# Patient Record
Sex: MALE | Race: WHITE | NOT HISPANIC OR LATINO | Employment: FULL TIME | URBAN - METROPOLITAN AREA
[De-identification: names, ages, dates, MRNs, and addresses within clinical notes are randomized per-mention and may not be internally consistent; named-entity substitution may affect disease eponyms.]

---

## 2018-09-27 ENCOUNTER — OFFICE VISIT (OUTPATIENT)
Dept: INTERNAL MEDICINE CLINIC | Facility: CLINIC | Age: 33
End: 2018-09-27
Payer: COMMERCIAL

## 2018-09-27 VITALS
SYSTOLIC BLOOD PRESSURE: 118 MMHG | WEIGHT: 212.6 LBS | OXYGEN SATURATION: 97 % | BODY MASS INDEX: 26.43 KG/M2 | HEIGHT: 75 IN | DIASTOLIC BLOOD PRESSURE: 86 MMHG | HEART RATE: 76 BPM

## 2018-09-27 DIAGNOSIS — F17.210 CIGARETTE NICOTINE DEPENDENCE WITHOUT COMPLICATION: Primary | ICD-10-CM

## 2018-09-27 DIAGNOSIS — Z13.6 SCREENING FOR CARDIOVASCULAR CONDITION: ICD-10-CM

## 2018-09-27 PROCEDURE — 99203 OFFICE O/P NEW LOW 30 MIN: CPT | Performed by: INTERNAL MEDICINE

## 2018-09-27 PROCEDURE — 3008F BODY MASS INDEX DOCD: CPT | Performed by: INTERNAL MEDICINE

## 2018-09-27 PROCEDURE — 4004F PT TOBACCO SCREEN RCVD TLK: CPT | Performed by: INTERNAL MEDICINE

## 2018-09-27 NOTE — PROGRESS NOTES
INTERNAL MEDICINE OFFICE VISIT  [facility] AT [address], [address] [facility] [place]  Tel: [phone]      NAME: Dipti Hess  AGE: 27 y o  SEX: male  : 1985   MRN: [id number]    DATE: 2018  TIME: 1:45 PM      Assessment and Plan:  1  Cigarette nicotine dependence without complication  Patient was counseled to quit smoking  - CBC and differential; Future  - Comprehensive metabolic panel; Future    2  Screening for cardiovascular condition    - Lipid panel; Future      - Counseling Documentation: patient was counseled regarding: instructions for management, risk factor reductions, prognosis, patient and family education, impressions, risks and benefits of treatment options and importance of compliance with treatment  - Medication Side Effects: Adverse side effects of medications were reviewed with the patient/guardian today  Return for follow up visit in 1 year or earlier, if needed  Chief Complaint:  Chief Complaint   Patient presents with    [facility]         History of Present Illness: This is a new patient who has moved from [place]  Current smoker-patient has been smoking for the last 15 years  He said he quit smoking for about 6 months last year but started smoking again  He does not want to take any medication to help him quit at this point  He needs blood work done  Active Problem List:  Patient Active Problem List   Diagnosis    Cigarette nicotine dependence without complication         Past Medical History:  History reviewed  No pertinent past medical history        Past Surgical History:  Past Surgical History:   Procedure Laterality Date    WISDOM TOOTH EXTRACTION           Family History:  Family History   Problem Relation Age of Onset    Heart defect Father     Heart disease Maternal Grandfather     Diabetes Maternal Grandfather     Diabetes Paternal Grandfather     Heart disease Paternal Grandfather  Diabetes Paternal Aunt          Social History:  Social History     Social History    Marital status: /Civil Union     Spouse name: N/A    Number of children: N/A    Years of education: N/A     Social History Main Topics    Smoking status: Current Every Day Smoker     Packs/day: 1 00     Years: 15 00    Smokeless tobacco: Never Used    Alcohol use Yes    Drug use: No    Sexual activity: Yes     Partners: Male     Other Topics Concern    None     Social History Narrative    None         Allergies:  No Known Allergies      Medications:  No current outpatient prescriptions on file        The following portions of the patient's history were reviewed and updated as appropriate: past medical history, past surgical history, family history, social history, allergies, current medications and active problem list       Review of Systems:  Constitutional: Denies fever, chills, weight gain, weight loss, fatigue  Eyes: Denies eye redness, eye discharge, double vision, change in visual acuity  ENT: Denies hearing loss, tinnitus, sneezing, nasal congestion, nasal discharge, sore throat   Respiratory: Denies cough, expectoration, hemoptysis, shortness of breath, wheezing  Cardiovascular: Denies chest pain, palpitations, lower extremity swelling, orthopnea, PND  Gastrointestinal: Denies abdominal pain, heartburn, nausea, vomiting, hematemesis, diarrhea, bloody stools  Genito-Urinary: Denies dysuria, frequency, difficulty in micturition, nocturia, incontinence  Musculoskeletal: Denies back pain, joint pain, muscle pain  Neurologic: Denies confusion, lightheadedness, syncope, headache, focal weakness, sensory changes, seizures  Endocrine: Denies polyuria, polydipsia, temperature intolerance  Allergy and Immunology: Denies hives, insect bite sensitivity  Hematological and Lymphatic: Denies bleeding problems, swollen glands   Psychological: Denies depression, suicidal ideation, anxiety, panic, mood swings  Dermatological: Denies pruritus, rash, skin lesion changes      Vitals:  Vitals:    09/27/18 1329   BP: 118/86   Pulse: 76   SpO2: 97%       Body mass index is 26 57 kg/m²  Weight (last 2 days)     Date/Time   Weight    09/27/18 1329  96 4 (212 6)                Physical Examination:  General: Patient is not in acute distress  Awake, alert, responding to commands  No weight gain or loss  Head: Normocephalic  Atraumatic  Eyes: Conjunctiva and lids with no swelling, erythema or discharge  Both pupils normal sized, round and reactive to light  Sclera nonicteric  ENT: External examination of nose and ear normal  Otoscopic examination shows translucent tympanic membranes with patent canals without erythema  Oropharynx moist with no erythema, edema, exudate or lesions  Neck: Supple  JVP not raised  Trachea midline  No masses  No thyromegaly  Lungs: No signs of increased work of breathing or respiratory distress  Bilateral bronchovascular breath sounds with no crackles or rhonchi  Chest wall: No tenderness  Cardiovascular: Normal PMI  No thrills  Regular rate and rhythm  S1 and S2 normal  No murmur, rub or gallop  Gastrointestinal: Abdomen soft, nontender  No guarding or rigidity  Liver and spleen not palpable  Bowel sounds present  Neurologic: Cranial nerves II-XII intact   Cortical functions normal  Motor system - Reflexes 2+ and symmetrical  Sensations normal  Musculoskeletal: Gait normal  No joint tenderness  Integumentary: Skin normal with no rash or lesions  Lymphatic: No palpable lymph nodes in neck, axilla or groin  Extremities: No clubbing, cyanosis, edema or varicosities  Psychological: Judgement and insight normal  Mood and affect normal      Laboratory Results:  CBC with diff:   No results found for: WBC, RBC, HGB, HCT, MCV, MCH, RDW, PLT    CMP:  No results found for: CREATININE, BUN, NA, K, CL, CO2, GLUCOSE, PROT, ALKPHOS, ALT, AST, BILIDIR    No results found for: HGBA1C, MG, PHOS    No results found for: TROPONINI, CKMB, CKTOTAL    Lipid Profile:   No results found for: CHOL  No results found for: HDL  No results found for: LDLCALC  No results found for: TRIG      Health Maintenance:  Health Maintenance   Topic Date Due    Depression Screening PHQ  1985    Pneumococcal PPSV23 Medium Risk Adult (1 of 1 - PPSV23) 11/15/2002    DTaP,Tdap,and Td Vaccines (1 - Tdap) 11/15/2004    INFLUENZA VACCINE  09/01/2018       There is no immunization history on file for this patient        Tresa Orellana MD  9/27/2018,1:45 PM